# Patient Record
Sex: FEMALE | Race: WHITE | NOT HISPANIC OR LATINO
[De-identification: names, ages, dates, MRNs, and addresses within clinical notes are randomized per-mention and may not be internally consistent; named-entity substitution may affect disease eponyms.]

---

## 2018-07-01 ENCOUNTER — TRANSCRIPTION ENCOUNTER (OUTPATIENT)
Age: 83
End: 2018-07-01

## 2020-07-06 ENCOUNTER — OUTPATIENT (OUTPATIENT)
Dept: OUTPATIENT SERVICES | Facility: HOSPITAL | Age: 85
LOS: 1 days | Discharge: HOME | End: 2020-07-06

## 2020-07-06 DIAGNOSIS — D72.89 OTHER SPECIFIED DISORDERS OF WHITE BLOOD CELLS: ICD-10-CM

## 2020-07-08 ENCOUNTER — APPOINTMENT (OUTPATIENT)
Dept: HEMATOLOGY ONCOLOGY | Facility: CLINIC | Age: 85
End: 2020-07-08
Payer: MEDICARE

## 2020-07-08 DIAGNOSIS — Z86.79 PERSONAL HISTORY OF OTHER DISEASES OF THE CIRCULATORY SYSTEM: ICD-10-CM

## 2020-07-08 DIAGNOSIS — Z63.4 DISAPPEARANCE AND DEATH OF FAMILY MEMBER: ICD-10-CM

## 2020-07-08 DIAGNOSIS — E11.9 TYPE 2 DIABETES MELLITUS W/OUT COMPLICATIONS: ICD-10-CM

## 2020-07-08 DIAGNOSIS — Z86.73 PERSONAL HISTORY OF TRANSIENT ISCHEMIC ATTACK (TIA), AND CEREBRAL INFARCTION W/OUT RESIDUAL DEFICITS: ICD-10-CM

## 2020-07-08 DIAGNOSIS — H54.61 UNQUALIFIED VISUAL LOSS, RIGHT EYE, NORMAL VISION LEFT EYE: ICD-10-CM

## 2020-07-08 DIAGNOSIS — Z80.8 FAMILY HISTORY OF MALIGNANT NEOPLASM OF OTHER ORGANS OR SYSTEMS: ICD-10-CM

## 2020-07-08 DIAGNOSIS — Z86.39 PERSONAL HISTORY OF OTHER ENDOCRINE, NUTRITIONAL AND METABOLIC DISEASE: ICD-10-CM

## 2020-07-08 PROCEDURE — 99204 OFFICE O/P NEW MOD 45 MIN: CPT | Mod: 95

## 2020-07-08 RX ORDER — GLIPIZIDE 5 MG/1
5 TABLET ORAL
Refills: 0 | Status: ACTIVE | COMMUNITY

## 2020-07-08 RX ORDER — CLOPIDOGREL BISULFATE 75 MG/1
75 TABLET, FILM COATED ORAL
Refills: 0 | Status: ACTIVE | COMMUNITY

## 2020-07-08 RX ORDER — LOSARTAN POTASSIUM 100 MG/1
100 TABLET, FILM COATED ORAL
Refills: 0 | Status: ACTIVE | COMMUNITY

## 2020-07-08 RX ORDER — ASPIRIN 81 MG/1
81 TABLET ORAL
Refills: 0 | Status: ACTIVE | COMMUNITY

## 2020-07-08 RX ORDER — METFORMIN HYDROCHLORIDE 500 MG/1
500 TABLET, COATED ORAL
Refills: 0 | Status: ACTIVE | COMMUNITY

## 2020-07-08 RX ORDER — ATORVASTATIN CALCIUM 10 MG/1
10 TABLET, FILM COATED ORAL
Refills: 0 | Status: ACTIVE | COMMUNITY

## 2020-07-08 RX ORDER — UBIDECARENONE/VIT E ACET 100MG-5
CAPSULE ORAL
Refills: 0 | Status: ACTIVE | COMMUNITY

## 2020-07-08 SDOH — SOCIAL STABILITY - SOCIAL INSECURITY: DISSAPEARANCE AND DEATH OF FAMILY MEMBER: Z63.4

## 2020-07-08 NOTE — ASSESSMENT
[FreeTextEntry1] : #1)  Anemia–appears chronic upon review of prior lab work available.  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  No overt bleeding reported currently.  Guaiac stool.\par Differential diagnosis reviewed with patient and her daughter.  Check vitamin levels, SPEP.  May consider trial of growth factor pending course.\par #2) leukocytosis–suspect may be reactive in nature (note WBC within normal limits 11/2019), though no obvious infectious process to implicate clinically presently. Rule out underlying myeloproliferative disorder contributing now.  Patient is agreeable to follow-up lab work.  She and daughter wish for supportive/conservative hematologic care (and no bone marrow evaluation planned at this time).\par Patient and her daughter were given the opportunity to ask questions.  Their questions have been answered to their apparent satisfaction.  Patient is agreeable to recommended follow-up.

## 2020-07-08 NOTE — RESULTS/DATA
[FreeTextEntry1] : 6/23/2020–hemoglobin 10.1, hematocrit 30.9, MCV 93, WBC 11.6 with 68% neutrophils, 23% lymphocytes, platelet count 284,000.\par 11/18/2019–hemoglobin 10.5, hematocrit 30.7, MCV 89, WBC 9.8 with 66% neutrophils, 25% lymphocytes, platelet count 265,000.

## 2020-07-08 NOTE — CONSULT LETTER
[Dear  ___] : Dear  [unfilled], [Consult Letter:] : I had the pleasure of evaluating your patient, [unfilled]. [Consult Closing:] : Thank you very much for allowing me to participate in the care of this patient.  If you have any questions, please do not hesitate to contact me. [Please see my note below.] : Please see my note below. [Sincerely,] : Sincerely, [FreeTextEntry3] : Ana Currie MD

## 2020-07-08 NOTE — REVIEW OF SYSTEMS
[Patient Intake Form Reviewed] : Patient intake form was reviewed [Loss of Hearing] : loss of hearing [SOB on Exertion] : shortness of breath during exertion [Negative] : Heme/Lymph [FreeTextEntry3] : vision loss OD

## 2020-07-08 NOTE — PHYSICAL EXAM
[Normal] : affect appropriate [de-identified] : non-toxic appearing [de-identified] : anicteric, no conjunctival infection [de-identified] : breathing appeared unlabored [de-identified] : alert and oriented  [de-identified] : coloration appeared normal [de-identified] : pleasant

## 2020-07-08 NOTE — REASON FOR VISIT
[Home] : at home, [unfilled] , at the time of the visit. [Medical Office: ([facility])___] : at the medical office located in  [Verbal consent obtained from patient] : the patient, [unfilled] [Initial Consultation] : an initial consultation for [Other:____] : [unfilled] [FreeTextEntry2] : leukocytosis/anemia

## 2020-07-08 NOTE — HISTORY OF PRESENT ILLNESS
[de-identified] : 7/2020-Patient presenting at the request of her PCP for leukocytosis and anemia found on routine lab work.\par Patient is generally feeling well.  She has recently noticed some mild dyspnea on exertion, though remains quite active.  No complaints of chest pain, shortness of breath at rest, palpitations or lightheadedness.  No nausea/vomiting/abdominal or pelvic pain.  No blood per rectum, melena, hematuria or vaginal bleeding.  She began p.o. iron supplement (1 daily) approximately 2 weeks ago, and stated her energy has improved since she began the iron (along with vitamin C).\par No complaints of cough, fevers.\par She has never had a colonoscopy.\par

## 2020-07-23 LAB
SARS-COV-2 IGG SERPL IA-ACNC: <0.1 INDEX
SARS-COV-2 IGG SERPL QL IA: NEGATIVE

## 2020-07-28 LAB — T(9;22)(ABL1,BCR)/CONTROL BLD/T: NORMAL

## 2020-07-30 ENCOUNTER — APPOINTMENT (OUTPATIENT)
Dept: HEMATOLOGY ONCOLOGY | Facility: CLINIC | Age: 85
End: 2020-07-30

## 2020-07-31 LAB
JAK2 P.V617F BLD/T QL: NEGATIVE
JAK2RLX: NEGATIVE

## 2020-08-04 ENCOUNTER — OUTPATIENT (OUTPATIENT)
Dept: OUTPATIENT SERVICES | Facility: HOSPITAL | Age: 85
LOS: 1 days | Discharge: HOME | End: 2020-08-04

## 2020-08-04 DIAGNOSIS — D72.89 OTHER SPECIFIED DISORDERS OF WHITE BLOOD CELLS: ICD-10-CM

## 2020-08-06 ENCOUNTER — LABORATORY RESULT (OUTPATIENT)
Age: 85
End: 2020-08-06

## 2020-08-06 LAB
ALBUMIN MFR SERPL ELPH: 58.6 %
ALBUMIN SERPL-MCNC: 4 G/DL
ALBUMIN/GLOB SERPL: 1.4 RATIO
ALPHA1 GLOB MFR SERPL ELPH: 4.5 %
ALPHA1 GLOB SERPL ELPH-MCNC: 0.3 G/DL
ALPHA2 GLOB MFR SERPL ELPH: 12.9 %
ALPHA2 GLOB SERPL ELPH-MCNC: 0.9 G/DL
APPEARANCE: CLEAR
B-GLOBULIN MFR SERPL ELPH: 11.9 %
B-GLOBULIN SERPL ELPH-MCNC: 0.8 G/DL
BASOPHILS # BLD AUTO: 0.04 K/UL
BASOPHILS NFR BLD AUTO: 0.4 %
BILIRUBIN URINE: NEGATIVE
BLOOD URINE: NEGATIVE
COLOR: NORMAL
CREAT SERPL-MCNC: 1.05 MG/DL
EOSINOPHIL # BLD AUTO: 0.24 K/UL
EOSINOPHIL NFR BLD AUTO: 2.3 %
FERRITIN SERPL-MCNC: 81 NG/ML
FOLATE SERPL-MCNC: >20 NG/ML
GAMMA GLOB FLD ELPH-MCNC: 0.8 G/DL
GAMMA GLOB MFR SERPL ELPH: 12.1 %
GLUCOSE QUALITATIVE U: NEGATIVE
HAPTOGLOB SERPL-MCNC: 225 MG/DL
HCT VFR BLD CALC: 31.2 %
HGB BLD-MCNC: 9.9 G/DL
IMM GRANULOCYTES NFR BLD AUTO: 0.3 %
INTERPRETATION SERPL IEP-IMP: NORMAL
IRON SATN MFR SERPL: 23 %
IRON SERPL-MCNC: 64 UG/DL
KETONES URINE: NEGATIVE
LEUKOCYTE ESTERASE URINE: ABNORMAL
LYMPHOCYTES # BLD AUTO: 2.04 K/UL
LYMPHOCYTES NFR BLD AUTO: 19.5 %
MAN DIFF?: NORMAL
MCHC RBC-ENTMCNC: 30.5 PG
MCHC RBC-ENTMCNC: 31.7 GM/DL
MCV RBC AUTO: 96 FL
MONOCYTES # BLD AUTO: 0.63 K/UL
MONOCYTES NFR BLD AUTO: 6 %
NEUTROPHILS # BLD AUTO: 7.47 K/UL
NEUTROPHILS NFR BLD AUTO: 71.5 %
NITRITE URINE: NEGATIVE
PH URINE: 6
PLATELET # BLD AUTO: 264 K/UL
PROT SERPL-MCNC: 6.8 G/DL
PROT SERPL-MCNC: 6.8 G/DL
PROTEIN URINE: ABNORMAL
RBC # BLD: 3.25 M/UL
RBC # BLD: 3.25 M/UL
RBC # FLD: 13.6 %
RETICS # AUTO: 1.2 %
RETICS AGGREG/RBC NFR: 38.7 K/UL
RHEUMATOID FACT SER QL: <10 IU/ML
SPECIFIC GRAVITY URINE: 1.01
TIBC SERPL-MCNC: 280 UG/DL
UIBC SERPL-MCNC: 216 UG/DL
UROBILINOGEN URINE: NORMAL
VIT B12 SERPL-MCNC: 345 PG/ML
WBC # FLD AUTO: 10.45 K/UL

## 2020-08-08 LAB — ANA SER IF-ACNC: NEGATIVE

## 2020-08-10 ENCOUNTER — APPOINTMENT (OUTPATIENT)
Dept: HEMATOLOGY ONCOLOGY | Facility: CLINIC | Age: 85
End: 2020-08-10
Payer: MEDICARE

## 2020-08-10 PROCEDURE — 99213 OFFICE O/P EST LOW 20 MIN: CPT | Mod: 95

## 2020-08-10 NOTE — ASSESSMENT
[FreeTextEntry1] : #1)  Anemia–chronic.  History of decreased EGFR may contribute.  No overt bleeding reported currently. Patient without related symptoms at this time.  Patient to begin p.o. vitamin B12 supplement for a vitamin B12 level of less than 400.\par #2) h/o leukocytosis–suspect reactive in nature.\par Reviewed lab work with patient and her daughter.  Discussed consideration of trial of growth factor if further decrease in hemoglobin-may further consider pending short interval follow-up lab work. \par Patient to follow-up with PCP regarding proteinuria/primary care issues.\par Patient and her daughter were given the opportunity to ask questions.  Their questions have been answered to their apparent satisfaction.  Patient is agreeable to recommended follow-up.  Supportive hematologic care planned.

## 2020-08-10 NOTE — PHYSICAL EXAM
[Normal] : PERRL, EOMI, no conjunctival infection, anicteric [de-identified] : breathing appeared unlabored [de-identified] : non-toxic appearing [de-identified] : alert and oriented  [de-identified] : coloration appeared normal [de-identified] : pleasant

## 2020-08-10 NOTE — HISTORY OF PRESENT ILLNESS
[de-identified] : 7/2020-Patient presented at the request of her PCP for leukocytosis and anemia found on routine lab work.\par \par \par \par  [de-identified] : Telehealth visit due to COVID-19 pandemic.\par Feeling well with good energy level. No complaints of chest pain, shortness of breath at rest, palpitations or lightheadedness.  No nausea/vomiting/abdominal or pelvic pain.  No blood per rectum, melena, hematuria or vaginal bleeding.  No c/o dysuria or fevers. She is taking p.o. iron supplement (1 daily) and is tolerating this well.

## 2020-08-10 NOTE — REASON FOR VISIT
[Home] : at home, [unfilled] , at the time of the visit. [Follow-Up Visit] : a follow-up visit for [Verbal consent obtained from patient] : the patient, [unfilled] [Medical Office: ([facility])___] : at the medical office located in  [Other:____] : [unfilled] [FreeTextEntry2] : anemia/leukocytosis

## 2020-08-21 ENCOUNTER — TRANSCRIPTION ENCOUNTER (OUTPATIENT)
Age: 85
End: 2020-08-21

## 2020-08-21 ENCOUNTER — EMERGENCY (EMERGENCY)
Facility: HOSPITAL | Age: 85
LOS: 1 days | Discharge: HOME | End: 2020-08-21
Attending: EMERGENCY MEDICINE
Payer: MEDICARE

## 2020-08-21 VITALS
DIASTOLIC BLOOD PRESSURE: 84 MMHG | TEMPERATURE: 98 F | SYSTOLIC BLOOD PRESSURE: 149 MMHG | HEART RATE: 94 BPM | HEIGHT: 56 IN | WEIGHT: 130.07 LBS | OXYGEN SATURATION: 99 % | RESPIRATION RATE: 16 BRPM

## 2020-08-21 VITALS
DIASTOLIC BLOOD PRESSURE: 72 MMHG | RESPIRATION RATE: 16 BRPM | OXYGEN SATURATION: 100 % | SYSTOLIC BLOOD PRESSURE: 206 MMHG | HEART RATE: 90 BPM

## 2020-08-21 LAB
ALBUMIN SERPL ELPH-MCNC: 4.4 G/DL — SIGNIFICANT CHANGE UP (ref 3.3–5)
ALP SERPL-CCNC: 78 U/L — SIGNIFICANT CHANGE UP (ref 40–120)
ALT FLD-CCNC: 10 U/L — SIGNIFICANT CHANGE UP (ref 10–45)
ANION GAP SERPL CALC-SCNC: 15 MMOL/L — SIGNIFICANT CHANGE UP (ref 5–17)
APTT BLD: 38.8 SEC — HIGH (ref 27.5–35.5)
AST SERPL-CCNC: 15 U/L — SIGNIFICANT CHANGE UP (ref 10–40)
BASOPHILS # BLD AUTO: 0.05 K/UL — SIGNIFICANT CHANGE UP (ref 0–0.2)
BASOPHILS NFR BLD AUTO: 0.3 % — SIGNIFICANT CHANGE UP (ref 0–2)
BILIRUB SERPL-MCNC: 0.2 MG/DL — SIGNIFICANT CHANGE UP (ref 0.2–1.2)
BUN SERPL-MCNC: 18 MG/DL — SIGNIFICANT CHANGE UP (ref 7–23)
CALCIUM SERPL-MCNC: 9.8 MG/DL — SIGNIFICANT CHANGE UP (ref 8.4–10.5)
CHLORIDE SERPL-SCNC: 99 MMOL/L — SIGNIFICANT CHANGE UP (ref 96–108)
CO2 SERPL-SCNC: 21 MMOL/L — LOW (ref 22–31)
CREAT SERPL-MCNC: 1.06 MG/DL — SIGNIFICANT CHANGE UP (ref 0.5–1.3)
EOSINOPHIL # BLD AUTO: 0.05 K/UL — SIGNIFICANT CHANGE UP (ref 0–0.5)
EOSINOPHIL NFR BLD AUTO: 0.3 % — SIGNIFICANT CHANGE UP (ref 0–6)
GLUCOSE SERPL-MCNC: 133 MG/DL — HIGH (ref 70–99)
HCT VFR BLD CALC: 32.3 % — LOW (ref 34.5–45)
HGB BLD-MCNC: 10.5 G/DL — LOW (ref 11.5–15.5)
IMM GRANULOCYTES NFR BLD AUTO: 0.5 % — SIGNIFICANT CHANGE UP (ref 0–1.5)
INR BLD: 0.98 RATIO — SIGNIFICANT CHANGE UP (ref 0.88–1.16)
LYMPHOCYTES # BLD AUTO: 1.32 K/UL — SIGNIFICANT CHANGE UP (ref 1–3.3)
LYMPHOCYTES # BLD AUTO: 8.6 % — LOW (ref 13–44)
MCHC RBC-ENTMCNC: 30.2 PG — SIGNIFICANT CHANGE UP (ref 27–34)
MCHC RBC-ENTMCNC: 32.5 GM/DL — SIGNIFICANT CHANGE UP (ref 32–36)
MCV RBC AUTO: 92.8 FL — SIGNIFICANT CHANGE UP (ref 80–100)
MONOCYTES # BLD AUTO: 0.64 K/UL — SIGNIFICANT CHANGE UP (ref 0–0.9)
MONOCYTES NFR BLD AUTO: 4.2 % — SIGNIFICANT CHANGE UP (ref 2–14)
NEUTROPHILS # BLD AUTO: 13.29 K/UL — HIGH (ref 1.8–7.4)
NEUTROPHILS NFR BLD AUTO: 86.1 % — HIGH (ref 43–77)
NRBC # BLD: 0 /100 WBCS — SIGNIFICANT CHANGE UP (ref 0–0)
PLATELET # BLD AUTO: 297 K/UL — SIGNIFICANT CHANGE UP (ref 150–400)
POTASSIUM SERPL-MCNC: 4.6 MMOL/L — SIGNIFICANT CHANGE UP (ref 3.5–5.3)
POTASSIUM SERPL-SCNC: 4.6 MMOL/L — SIGNIFICANT CHANGE UP (ref 3.5–5.3)
PROT SERPL-MCNC: 7.2 G/DL — SIGNIFICANT CHANGE UP (ref 6–8.3)
PROTHROM AB SERPL-ACNC: 11.7 SEC — SIGNIFICANT CHANGE UP (ref 10.6–13.6)
RBC # BLD: 3.48 M/UL — LOW (ref 3.8–5.2)
RBC # FLD: 13.2 % — SIGNIFICANT CHANGE UP (ref 10.3–14.5)
SODIUM SERPL-SCNC: 135 MMOL/L — SIGNIFICANT CHANGE UP (ref 135–145)
WBC # BLD: 15.42 K/UL — HIGH (ref 3.8–10.5)
WBC # FLD AUTO: 15.42 K/UL — HIGH (ref 3.8–10.5)

## 2020-08-21 PROCEDURE — 72125 CT NECK SPINE W/O DYE: CPT | Mod: 26

## 2020-08-21 PROCEDURE — 73030 X-RAY EXAM OF SHOULDER: CPT

## 2020-08-21 PROCEDURE — 76377 3D RENDER W/INTRP POSTPROCES: CPT | Mod: 26

## 2020-08-21 PROCEDURE — 73060 X-RAY EXAM OF HUMERUS: CPT

## 2020-08-21 PROCEDURE — 70486 CT MAXILLOFACIAL W/O DYE: CPT | Mod: 26

## 2020-08-21 PROCEDURE — 85730 THROMBOPLASTIN TIME PARTIAL: CPT

## 2020-08-21 PROCEDURE — 76377 3D RENDER W/INTRP POSTPROCES: CPT

## 2020-08-21 PROCEDURE — 99285 EMERGENCY DEPT VISIT HI MDM: CPT

## 2020-08-21 PROCEDURE — 85027 COMPLETE CBC AUTOMATED: CPT

## 2020-08-21 PROCEDURE — 70486 CT MAXILLOFACIAL W/O DYE: CPT

## 2020-08-21 PROCEDURE — 80053 COMPREHEN METABOLIC PANEL: CPT

## 2020-08-21 PROCEDURE — 70450 CT HEAD/BRAIN W/O DYE: CPT | Mod: 26

## 2020-08-21 PROCEDURE — 99284 EMERGENCY DEPT VISIT MOD MDM: CPT | Mod: 25

## 2020-08-21 PROCEDURE — 72125 CT NECK SPINE W/O DYE: CPT

## 2020-08-21 PROCEDURE — 70450 CT HEAD/BRAIN W/O DYE: CPT

## 2020-08-21 PROCEDURE — 85610 PROTHROMBIN TIME: CPT

## 2020-08-21 PROCEDURE — 73060 X-RAY EXAM OF HUMERUS: CPT | Mod: 26,LT

## 2020-08-21 PROCEDURE — 73030 X-RAY EXAM OF SHOULDER: CPT | Mod: 26,LT

## 2020-08-21 NOTE — ED PROVIDER NOTE - PATIENT PORTAL LINK FT
You can access the FollowMyHealth Patient Portal offered by [facility] by registering at the following website: [email or web address]. By joining [facility]’s FollowMyHealth portal, you will also be able to view your health information using other applications (apps) compatible with our system.

## 2020-08-21 NOTE — ED ADULT NURSE NOTE - OBJECTIVE STATEMENT
87 y f came to the ed after a mechanical fall. patient states she was walking trying to avoid stepping in geese feces when she lost her balance and fell. patient denies loc but said she did hit her head. patient has hematoma over left eye. patient states taking plavix for anticoagulation. patient denies any new vision changes. patient is a/ox3. denies any fevers, chills, chest pain, sob. equal strength and sensation in extremities. skin is warm and dry.

## 2020-08-21 NOTE — ED PROVIDER NOTE - PHYSICAL EXAMINATION
GENERAL: no acute distress, non-toxic appearing  HEAD: normocephalic, atraumatic  HEENT: normal conjunctiva, oral mucosa moist, neck supple  CARDIAC: regular rate and rhythm, normal S1 and S2,  no appreciable murmurs  PULM: clear to ascultation bilaterally, no crackles, rales, rhonchi, or wheezing  GI: abdomen nondistended, soft, nontender, no guarding or rebound tenderness  : no CVA tenderness, no suprapubic tenderness  NEURO: alert and oriented x 3, normal speech, PERRLA, EOMI, no focal motor or sensory deficits, nonantalgic gait    MSK: no visible deformities, pain to palpation over the L shoulder with difficultly raising L arm over head. no hip pain, pelvis is stable    SKIN: no visible rashes, dry, well-perfused  PSYCH: appropriate mood and affect

## 2020-08-21 NOTE — ED PROVIDER NOTE - OBJECTIVE STATEMENT
86 yo F with a pmhx of diabetes, prior stroke, HTN presents to the ED s/p mechanical fall. She was trying to step over a curb before she fell. She denies any LOC. She denies any prodromal symptoms. She fell on her head over her left face. She is complaining of left shoulder pain. Shd denies any CP, SOB, abdominal pain, urinary symptoms, back pain, neck pain, vision changes, fevers, chills, N/V/D.

## 2020-08-21 NOTE — ED PROVIDER NOTE - CLINICAL SUMMARY MEDICAL DECISION MAKING FREE TEXT BOX
88 yo F with a pmhx of diabetes, prior stroke, HTN presents to the ED s/p mechanical fall. no prodromal symptoms. complaining of L shoulder pain and unable to raise L arm overhead. ecchymosis inferior to the left eye. no burton sign, no periauricular ecchymosis, no hemotympanum.     will order head ct, cervical, maxfac, will obtain xray of LUE. reassess 86 yo F with a pmhx of diabetes, prior stroke, HTN presents to the ED s/p mechanical fall. no prodromal symptoms. complaining of L shoulder pain and unable to raise L arm overhead. ecchymosis inferior to the left eye. no burton sign, no periauricular ecchymosis, no hemotympanum.     will order head ct, cervical, maxfac, will obtain xray of LUE. reassess  ZR

## 2020-08-21 NOTE — ED PROVIDER NOTE - NSFOLLOWUPINSTRUCTIONS_ED_ALL_ED_FT
-You were seen in the Emergency Department (ED) for FALL. Lab and imaging results, if performed, were discussed with you along with your discharge diagnosis.    PLEASE FOLLOW UP WITH YOUR PRIMARY CARE DOCTOR. PLEASE BRING ALL DOCUMENTATION WITH YOU TO YOUR VISIT.    MEDICATIONS:  -Continue all other prescribed medicine, IF ANY, as per your primary care doctor's (PMD) recommendations.    PAIN CONTROL:  -Please take over the counter Tylenol (also known as acetaminophen) 650mg every 6 hours or Ibuprofen (also known as motrin, advil) 600mg every 8 hour for your pain, IF ANY, unless you are not supposed to for any reason.  -Rest, stay hydrated with plenty of fluids (drink at least 2 Liters or 64 Ounces of water each day UNLESS you are supposed to restrict fluids or ANY reason.    FOLLOW-UP:  -Please follow up with your private physician within the next 72 hours. Tell them you were recently in the ED for an urgent issue and would like to be seen. Bring copies of your results if you were given.   -If you do not have a PMD, please call [phone] to find one convenient for you or call our clinic at [phone].    RETURN PRECAUTIONS:  -Please return to the Emergency Department if you experience ANY new or concerning symptoms, such as, but not limited to: worsening pain, large amount of bleeding, passing out, fever >100.F, shaking chills, inability to see or new double vision, chest pain, difficulty breathing, diffuse abdominal pain, unable to eat or drink, continuous vomiting or diarrhea, unable to move or feel part of your body

## 2020-08-21 NOTE — ED PROVIDER NOTE - NS ED ROS FT
GENERAL: no fever, chills    HEENT: no cough, congestion, odynophagia, dysphagia    CARDIAC: no chest pain, palpitations, lightheadedness  PULM: no dyspnea, wheezing   GI: no abdominal pain, nausea, vomiting, diarrhea, constipation, melena, hematochezia  : no urinary dysuria, frequency, incontinence, hematuria  NEURO: no headache, motor weakness, sensory changes  MSK: (+) L shoulder pain  SKIN: no rashes, hives  HEME: no active bleeding, bruising

## 2020-11-12 ENCOUNTER — OUTPATIENT (OUTPATIENT)
Dept: OUTPATIENT SERVICES | Facility: HOSPITAL | Age: 85
LOS: 1 days | Discharge: HOME | End: 2020-11-12

## 2020-11-12 DIAGNOSIS — D72.89 OTHER SPECIFIED DISORDERS OF WHITE BLOOD CELLS: ICD-10-CM

## 2020-12-11 LAB
BASOPHILS # BLD AUTO: 0.04 K/UL
BASOPHILS NFR BLD AUTO: 0.5 %
EOSINOPHIL # BLD AUTO: 0.22 K/UL
EOSINOPHIL NFR BLD AUTO: 2.8 %
FOLATE SERPL-MCNC: 19.1 NG/ML
HCT VFR BLD CALC: 33.5 %
HGB BLD-MCNC: 10.5 G/DL
IMM GRANULOCYTES NFR BLD AUTO: 0.1 %
LYMPHOCYTES # BLD AUTO: 2.33 K/UL
LYMPHOCYTES NFR BLD AUTO: 29.9 %
MAN DIFF?: NORMAL
MCHC RBC-ENTMCNC: 30.9 PG
MCHC RBC-ENTMCNC: 31.3 GM/DL
MCV RBC AUTO: 98.5 FL
MONOCYTES # BLD AUTO: 0.58 K/UL
MONOCYTES NFR BLD AUTO: 7.5 %
NEUTROPHILS # BLD AUTO: 4.6 K/UL
NEUTROPHILS NFR BLD AUTO: 59.2 %
PLATELET # BLD AUTO: 245 K/UL
RBC # BLD: 3.4 M/UL
RBC # FLD: 13 %
VIT B12 SERPL-MCNC: 1005 PG/ML
WBC # FLD AUTO: 7.78 K/UL

## 2020-12-17 ENCOUNTER — OUTPATIENT (OUTPATIENT)
Dept: OUTPATIENT SERVICES | Facility: HOSPITAL | Age: 85
LOS: 1 days | Discharge: HOME | End: 2020-12-17

## 2020-12-17 DIAGNOSIS — D72.89 OTHER SPECIFIED DISORDERS OF WHITE BLOOD CELLS: ICD-10-CM

## 2020-12-23 ENCOUNTER — APPOINTMENT (OUTPATIENT)
Dept: HEMATOLOGY ONCOLOGY | Facility: CLINIC | Age: 85
End: 2020-12-23
Payer: MEDICARE

## 2020-12-23 PROCEDURE — 99213 OFFICE O/P EST LOW 20 MIN: CPT | Mod: 95

## 2020-12-23 NOTE — ASSESSMENT
[FreeTextEntry1] : #1)  Anemia–chronic.  History of decreased EGFR may contribute.  No overt bleeding reported currently. Patient without related symptoms at this time. Hemoglobin stable at present. Patient to take p.o. vitamin B12 supplement for a vitamin B12 level of less than 400, and she will continue PO iron supplement for now. Holding on GF at this time. Interval f/u lab work to be done. \par #2) h/o leukocytosis–suspect reactive in nature. Most recent WBC WNL. Follow clinically.\par Patient and her daughter were given the opportunity to ask questions.  Their questions have been answered to their apparent satisfaction.  Patient is agreeable to recommended follow-up.  Supportive hematologic care planned.

## 2020-12-23 NOTE — HISTORY OF PRESENT ILLNESS
[de-identified] : 7/2020-Patient presented at the request of her PCP for leukocytosis and anemia found on routine lab work.\par \par \par \par  [de-identified] : Telehealth visit due to COVID-19 pandemic.\par Taking 1 iron tab daily.\par No complaints of chest pain, shortness of breath at rest, palpitations or lightheadedness.  No nausea/vomiting/abdominal or pelvic pain.  No blood per rectum, melena, hematuria or vaginal bleeding.  No c/o dysuria or fevers. \par Daughter assisted with visit.

## 2020-12-23 NOTE — REASON FOR VISIT
[Home] : at home, [unfilled] , at the time of the visit. [Medical Office: ([facility])___] : at the medical office located in  [Family Member] : family member [Verbal consent obtained from patient] : the patient, [unfilled] [Follow-Up Visit] : a follow-up visit for [FreeTextEntry2] : anemia/leukocytosis

## 2020-12-23 NOTE — PHYSICAL EXAM
[Normal] : affect appropriate [de-identified] : non-toxic appearing [de-identified] : breathing appeared unlabored [de-identified] : coloration appeared normal [de-identified] : alert and oriented  [de-identified] : pleasant

## 2021-01-25 ENCOUNTER — TRANSCRIPTION ENCOUNTER (OUTPATIENT)
Age: 86
End: 2021-01-25

## 2021-03-09 LAB
BASOPHILS # BLD AUTO: 0.04 K/UL
BASOPHILS NFR BLD AUTO: 0.5 %
EOSINOPHIL # BLD AUTO: 0.21 K/UL
EOSINOPHIL NFR BLD AUTO: 2.4 %
FERRITIN SERPL-MCNC: 222 NG/ML
FOLATE SERPL-MCNC: 12.4 NG/ML
HCT VFR BLD CALC: 27.5 %
HGB BLD-MCNC: 8.7 G/DL
IMM GRANULOCYTES NFR BLD AUTO: 0.3 %
IRON SATN MFR SERPL: 27 %
IRON SERPL-MCNC: 70 UG/DL
LYMPHOCYTES # BLD AUTO: 1.53 K/UL
LYMPHOCYTES NFR BLD AUTO: 17.7 %
MAN DIFF?: NORMAL
MCHC RBC-ENTMCNC: 31.6 GM/DL
MCHC RBC-ENTMCNC: 32.5 PG
MCV RBC AUTO: 102.6 FL
MONOCYTES # BLD AUTO: 0.63 K/UL
MONOCYTES NFR BLD AUTO: 7.3 %
NEUTROPHILS # BLD AUTO: 6.21 K/UL
NEUTROPHILS NFR BLD AUTO: 71.8 %
PLATELET # BLD AUTO: 274 K/UL
RBC # BLD: 2.68 M/UL
RBC # BLD: 2.68 M/UL
RBC # FLD: 13.2 %
RETICS # AUTO: 1 %
RETICS AGGREG/RBC NFR: 26.5 K/UL
SARS-COV-2 IGG SERPL IA-ACNC: 0.11 INDEX
SARS-COV-2 IGG SERPL QL IA: NEGATIVE
TIBC SERPL-MCNC: 264 UG/DL
UIBC SERPL-MCNC: 193 UG/DL
VIT B12 SERPL-MCNC: 1092 PG/ML
WBC # FLD AUTO: 8.65 K/UL

## 2021-03-15 ENCOUNTER — NON-APPOINTMENT (OUTPATIENT)
Age: 86
End: 2021-03-15

## 2021-03-17 ENCOUNTER — OUTPATIENT (OUTPATIENT)
Dept: OUTPATIENT SERVICES | Facility: HOSPITAL | Age: 86
LOS: 1 days | Discharge: HOME | End: 2021-03-17

## 2021-03-17 DIAGNOSIS — D72.89 OTHER SPECIFIED DISORDERS OF WHITE BLOOD CELLS: ICD-10-CM

## 2021-03-22 ENCOUNTER — APPOINTMENT (OUTPATIENT)
Dept: HEMATOLOGY ONCOLOGY | Facility: CLINIC | Age: 86
End: 2021-03-22

## 2021-03-23 ENCOUNTER — APPOINTMENT (OUTPATIENT)
Dept: HEMATOLOGY ONCOLOGY | Facility: CLINIC | Age: 86
End: 2021-03-23
Payer: MEDICARE

## 2021-03-23 VITALS — SYSTOLIC BLOOD PRESSURE: 176 MMHG | DIASTOLIC BLOOD PRESSURE: 60 MMHG

## 2021-03-23 VITALS
RESPIRATION RATE: 17 BRPM | TEMPERATURE: 96.9 F | HEART RATE: 86 BPM | HEIGHT: 57.8 IN | SYSTOLIC BLOOD PRESSURE: 199 MMHG | WEIGHT: 130.95 LBS | BODY MASS INDEX: 27.49 KG/M2 | DIASTOLIC BLOOD PRESSURE: 64 MMHG | OXYGEN SATURATION: 99 %

## 2021-03-23 PROCEDURE — 99214 OFFICE O/P EST MOD 30 MIN: CPT

## 2021-03-23 PROCEDURE — 99072 ADDL SUPL MATRL&STAF TM PHE: CPT

## 2021-03-23 RX ORDER — GABAPENTIN 100 MG/1
100 CAPSULE ORAL
Qty: 180 | Refills: 0 | Status: ACTIVE | COMMUNITY
Start: 2020-05-13

## 2021-03-23 RX ORDER — APIXABAN 2.5 MG/1
2.5 TABLET, FILM COATED ORAL
Qty: 60 | Refills: 0 | Status: ACTIVE | COMMUNITY
Start: 2021-02-02

## 2021-03-23 RX ORDER — OMEPRAZOLE 20 MG/1
20 CAPSULE, DELAYED RELEASE ORAL
Qty: 30 | Refills: 0 | Status: ACTIVE | COMMUNITY
Start: 2021-02-25

## 2021-03-23 RX ORDER — VALSARTAN 320 MG/1
320 TABLET, COATED ORAL
Qty: 90 | Refills: 0 | Status: ACTIVE | COMMUNITY
Start: 2021-01-11

## 2021-03-23 RX ORDER — DIAZEPAM 2 MG/1
2 TABLET ORAL
Qty: 15 | Refills: 0 | Status: ACTIVE | COMMUNITY
Start: 2021-02-19

## 2021-03-23 RX ORDER — ATORVASTATIN CALCIUM 40 MG/1
40 TABLET, FILM COATED ORAL
Qty: 30 | Refills: 0 | Status: ACTIVE | COMMUNITY
Start: 2021-02-03

## 2021-03-23 RX ORDER — AMLODIPINE BESYLATE 10 MG/1
10 TABLET ORAL
Qty: 30 | Refills: 0 | Status: ACTIVE | COMMUNITY
Start: 2021-01-28

## 2021-03-23 RX ORDER — HYDRALAZINE HYDROCHLORIDE 50 MG/1
50 TABLET ORAL
Qty: 90 | Refills: 0 | Status: ACTIVE | COMMUNITY
Start: 2021-01-28

## 2021-03-23 RX ORDER — AMLODIPINE BESYLATE 5 MG/1
5 TABLET ORAL
Qty: 90 | Refills: 0 | Status: ACTIVE | COMMUNITY
Start: 2021-02-22

## 2021-03-23 RX ORDER — GLIPIZIDE 5 MG/1
5 TABLET, FILM COATED, EXTENDED RELEASE ORAL
Qty: 240 | Refills: 0 | Status: ACTIVE | COMMUNITY
Start: 2020-07-20

## 2021-03-23 RX ORDER — HYDROCHLOROTHIAZIDE 25 MG/1
25 TABLET ORAL
Qty: 90 | Refills: 0 | Status: ACTIVE | COMMUNITY
Start: 2021-01-11

## 2021-03-23 NOTE — PHYSICAL EXAM
[Normal] : full range of motion and no deformities appreciated [de-identified] : non-toxic appearing [de-identified] : no calf tenderness [de-identified] : resolving ecchymosis left flank [de-identified] : alert and oriented  [de-identified] : pleasant

## 2021-03-23 NOTE — REASON FOR VISIT
[Follow-Up Visit] : a follow-up visit for [Family Member] : family member [FreeTextEntry2] : leukocytosis, anemia

## 2021-03-23 NOTE — CONSULT LETTER
[Dear  ___] : Dear  [unfilled], [Courtesy Letter:] : I had the pleasure of seeing your patient, [unfilled], in my office today. [Please see my note below.] : Please see my note below. [Consult Closing:] : Thank you very much for allowing me to participate in the care of this patient.  If you have any questions, please do not hesitate to contact me. [Sincerely,] : Sincerely, [FreeTextEntry3] : Ana Currie MD

## 2021-03-23 NOTE — ASSESSMENT
[FreeTextEntry1] : #1)  Anemia–chronic.  History of decreased EGFR may contribute.  Recently exacerbated by hematoma/retroperitoneal bleed.  No overt bleeding noted currently. Patient without related symptoms at this time. Hemoglobin stable at present (most recent lab work available reviewed, compared to hospital lab work). Patient to take p.o. vitamin B12 supplement for h/o a vitamin B12 level of less than 400, and she will continue PO iron supplement for now, though increase it to twice daily dosing–takes vitamin C supplement as well. Holding on GF at this time. Interval f/u lab work to be done. \par #2) h/o leukocytosis–suspect reactive in nature. Most recent WBC WNL. Follow clinically.\par \par Patient and her daughter were given the opportunity to ask questions.  Their questions have been answered to their apparent satisfaction.  Patient is agreeable to recommended follow-up.  Supportive hematologic care planned.

## 2021-03-23 NOTE — HISTORY OF PRESENT ILLNESS
[de-identified] : 7/2020-Patient presented at the request of her PCP for leukocytosis and anemia found on routine lab work.\par \par \par \par  [de-identified] : S/P hospitalization post a fall with resultant hematoma/retroperitoneal bleed.\par Once home, had TIA-develeoped left facial droop and dysarthria-those symptoms improved.\par PCP working on BP management. BS under control.\par Off Plavix and aspirin now.\par Taking 1 iron tab daily.\par No complaints of chest pain, shortness of breath at rest, palpitations or lightheadedness.  No nausea/vomiting/abdominal or pelvic pain.  No blood per rectum, melena, hematuria or vaginal bleeding.  No c/o dysuria or fevers. \par Daughter Elizabeth assisted with visit.

## 2021-04-12 ENCOUNTER — NON-APPOINTMENT (OUTPATIENT)
Age: 86
End: 2021-04-12

## 2021-04-20 LAB
BASOPHILS # BLD AUTO: 0.05 K/UL
BASOPHILS NFR BLD AUTO: 0.5 %
EOSINOPHIL # BLD AUTO: 0.15 K/UL
EOSINOPHIL NFR BLD AUTO: 1.4 %
FERRITIN SERPL-MCNC: 142 NG/ML
FOLATE SERPL-MCNC: >20 NG/ML
HCT VFR BLD CALC: 28.9 %
HGB BLD-MCNC: 9.5 G/DL
IMM GRANULOCYTES NFR BLD AUTO: 0.4 %
IRON SATN MFR SERPL: 20 %
IRON SERPL-MCNC: 55 UG/DL
LYMPHOCYTES # BLD AUTO: 2.21 K/UL
LYMPHOCYTES NFR BLD AUTO: 21.3 %
MAN DIFF?: NORMAL
MCHC RBC-ENTMCNC: 32.5 PG
MCHC RBC-ENTMCNC: 32.9 GM/DL
MCV RBC AUTO: 99 FL
MONOCYTES # BLD AUTO: 0.82 K/UL
MONOCYTES NFR BLD AUTO: 7.9 %
NEUTROPHILS # BLD AUTO: 7.12 K/UL
NEUTROPHILS NFR BLD AUTO: 68.5 %
PLATELET # BLD AUTO: 265 K/UL
RBC # BLD: 2.92 M/UL
RBC # BLD: 2.92 M/UL
RBC # FLD: 12.9 %
RETICS # AUTO: 1 %
RETICS AGGREG/RBC NFR: 30.1 K/UL
TIBC SERPL-MCNC: 278 UG/DL
UIBC SERPL-MCNC: 222 UG/DL
VIT B12 SERPL-MCNC: 1009 PG/ML
WBC # FLD AUTO: 10.39 K/UL

## 2021-04-24 ENCOUNTER — OUTPATIENT (OUTPATIENT)
Dept: OUTPATIENT SERVICES | Facility: HOSPITAL | Age: 86
LOS: 1 days | Discharge: HOME | End: 2021-04-24

## 2021-04-24 DIAGNOSIS — D72.89 OTHER SPECIFIED DISORDERS OF WHITE BLOOD CELLS: ICD-10-CM

## 2021-04-27 ENCOUNTER — APPOINTMENT (OUTPATIENT)
Dept: HEMATOLOGY ONCOLOGY | Facility: CLINIC | Age: 86
End: 2021-04-27
Payer: MEDICARE

## 2021-04-27 VITALS
TEMPERATURE: 96.8 F | WEIGHT: 129.98 LBS | RESPIRATION RATE: 17 BRPM | BODY MASS INDEX: 27.28 KG/M2 | DIASTOLIC BLOOD PRESSURE: 62 MMHG | SYSTOLIC BLOOD PRESSURE: 209 MMHG | HEIGHT: 58 IN | OXYGEN SATURATION: 98 % | HEART RATE: 75 BPM

## 2021-04-27 VITALS — DIASTOLIC BLOOD PRESSURE: 80 MMHG | SYSTOLIC BLOOD PRESSURE: 180 MMHG

## 2021-04-27 PROCEDURE — 99072 ADDL SUPL MATRL&STAF TM PHE: CPT

## 2021-04-27 PROCEDURE — 99213 OFFICE O/P EST LOW 20 MIN: CPT

## 2021-04-27 RX ORDER — METOPROLOL TARTRATE 25 MG/1
25 TABLET, FILM COATED ORAL DAILY
Refills: 0 | Status: ACTIVE | COMMUNITY
Start: 2021-04-27

## 2021-04-27 NOTE — PHYSICAL EXAM
[Normal] : affect appropriate [de-identified] : non-toxic appearing [de-identified] : no calf tenderness [de-identified] : alert and oriented  [de-identified] : pleasant

## 2021-04-27 NOTE — ASSESSMENT
[FreeTextEntry1] : Lab work reviewed.\par \par #1)  Anemia–chronic.  History of decreased EGFR may contribute.  Most recently was exacerbated by hematoma/retroperitoneal bleed.  No overt bleeding noted currently. Patient without related symptoms at this time. Hemoglobin improving. \par Patient to take p.o. vitamin B12 supplement for h/o a vitamin B12 level of less than 400.  Discussed iron treatment options with respective pros and cons. Patient declines continuing p.o. iron due to GI intolerance.  She wishes to try a multivitamin which contains iron p.o.  Interval follow-up lab work to be done.  Pending course, consider trial of parenteral iron if needed.\par \par #2) H/O leukocytosis–suspect reactive in nature. Most recent WBC WNL. Follow clinically.\par \par #3) Patient to take blood pressure medication as had been ordered by PCP, and will recheck her blood pressure once home.  Continue follow-up with PCP regarding this issue.\par \par Patient and her daughter were given the opportunity to ask questions.  Their questions have been answered to their apparent satisfaction.  Patient is agreeable to recommended follow-up.  Supportive hematologic care planned.

## 2021-04-27 NOTE — HISTORY OF PRESENT ILLNESS
[de-identified] : 7/2020-Patient presented at the request of her PCP for leukocytosis and anemia found on routine lab work. Intolerant to PO iron.\par \par \par \par  [de-identified] : Just took extra BP med now, along with some valium. Daughter stated patient's BP is elevated in physician's offices. \par Start taking iron supplement altogether, due to diarrhea.\par No complaints of H/A, chest pain, shortness of breath at rest, palpitations or lightheadedness.  No nausea/vomiting/abdominal or pelvic pain.  No blood per rectum, melena, hematuria or vaginal bleeding.  No c/o dysuria or fevers. \par Daughter Elizabeth assisted with visit history.

## 2021-05-20 LAB
BASOPHILS # BLD AUTO: 0.04 K/UL
BASOPHILS NFR BLD AUTO: 0.5 %
EOSINOPHIL # BLD AUTO: 0.13 K/UL
EOSINOPHIL NFR BLD AUTO: 1.6 %
FERRITIN SERPL-MCNC: 143 NG/ML
FOLATE SERPL-MCNC: >20 NG/ML
HCT VFR BLD CALC: 29.9 %
HGB BLD-MCNC: 9.5 G/DL
IMM GRANULOCYTES NFR BLD AUTO: 0.2 %
IRON SATN MFR SERPL: 24 %
IRON SERPL-MCNC: 69 UG/DL
IRON SERPL-MCNC: 70 UG/DL
LYMPHOCYTES # BLD AUTO: 1.72 K/UL
LYMPHOCYTES NFR BLD AUTO: 21.3 %
MAN DIFF?: NORMAL
MCHC RBC-ENTMCNC: 31.7 PG
MCHC RBC-ENTMCNC: 31.8 GM/DL
MCV RBC AUTO: 99.7 FL
MONOCYTES # BLD AUTO: 0.67 K/UL
MONOCYTES NFR BLD AUTO: 8.3 %
NEUTROPHILS # BLD AUTO: 5.5 K/UL
NEUTROPHILS NFR BLD AUTO: 68.1 %
PLATELET # BLD AUTO: 241 K/UL
RBC # BLD: 2.91 M/UL
RBC # BLD: 3 M/UL
RBC # FLD: 13 %
RETICS # AUTO: 0.8 %
RETICS AGGREG/RBC NFR: 22.4 K/UL
TIBC SERPL-MCNC: 285 UG/DL
UIBC SERPL-MCNC: 216 UG/DL
VIT B12 SERPL-MCNC: 808 PG/ML
WBC # FLD AUTO: 8.08 K/UL

## 2021-05-27 ENCOUNTER — OUTPATIENT (OUTPATIENT)
Dept: OUTPATIENT SERVICES | Facility: HOSPITAL | Age: 86
LOS: 1 days | Discharge: HOME | End: 2021-05-27

## 2021-05-27 DIAGNOSIS — D72.89 OTHER SPECIFIED DISORDERS OF WHITE BLOOD CELLS: ICD-10-CM

## 2021-06-01 ENCOUNTER — APPOINTMENT (OUTPATIENT)
Dept: HEMATOLOGY ONCOLOGY | Facility: CLINIC | Age: 86
End: 2021-06-01
Payer: MEDICARE

## 2021-06-01 PROCEDURE — 99213 OFFICE O/P EST LOW 20 MIN: CPT | Mod: 95

## 2021-06-01 RX ORDER — METOPROLOL SUCCINATE 25 MG/1
25 TABLET, EXTENDED RELEASE ORAL
Qty: 30 | Refills: 0 | Status: ACTIVE | COMMUNITY
Start: 2021-04-12

## 2021-06-01 RX ORDER — METOPROLOL SUCCINATE 50 MG/1
50 TABLET, EXTENDED RELEASE ORAL
Qty: 30 | Refills: 0 | Status: ACTIVE | COMMUNITY
Start: 2021-04-27

## 2021-06-01 NOTE — PHYSICAL EXAM
[Normal] : affect appropriate [de-identified] : non-toxic appearing [de-identified] : sclerae anicteric [de-identified] : breathing appeared unlabored [de-identified] : coloration appeared normal [de-identified] : alert and oriented  [de-identified] : pleasant

## 2021-06-01 NOTE — HISTORY OF PRESENT ILLNESS
[de-identified] : 7/2020-Patient presented at the request of her PCP for leukocytosis and anemia found on routine lab work. Intolerant to PO iron.\par \par \par \par  [de-identified] : Taking MVI with iron. Stopped taking iron supplement itself, due to diarrhea.\par No complaints of H/A, chest pain, shortness of breath at rest, palpitations or lightheadedness.  No nausea/vomiting/abdominal or pelvic pain.  No blood per rectum, melena, hematuria or vaginal bleeding.  No c/o dysuria or fevers. \par Daughter Elizabeth assisted with visit history.

## 2021-06-01 NOTE — ASSESSMENT
[FreeTextEntry1] : Lab work reviewed.\par #1)  Anemia–chronic.  History of decreased EGFR may contribute.  Most recently was exacerbated by hematoma/retroperitoneal bleed.  No overt bleeding reported currently. Patient without related symptoms at this time. Hemoglobin currently stable. \par Patient to continue p.o. vitamin B12 supplement for h/o a vitamin B12 level of less than 400.  Patient declines continuing p.o. iron due to GI intolerance, but will continue a multivitamin which contains iron p.o.  Interval follow-up lab work to be done.  Pending course, can consider trial of parenteral iron if needed and patient consents.\par She/daughter decline pursuing colonoscopy due to her advanced age.\par \par #2) H/O leukocytosis–suspect reactive in nature. Most recent WBC WNL. Follow clinically.\par \par Patient and her daughter were given the opportunity to ask questions.  Their questions have been answered to their apparent satisfaction.  Patient is agreeable to recommended follow-up.  Supportive hematologic care planned.

## 2021-06-01 NOTE — REASON FOR VISIT
[Home] : at home, [unfilled] , at the time of the visit. [Medical Office: ([facility])___] : at the medical office located in  [Family Member] : family member [Verbal consent obtained from patient] : the patient, [unfilled] [Follow-Up Visit] : a follow-up visit for [FreeTextEntry2] : anemia

## 2021-07-02 ENCOUNTER — NON-APPOINTMENT (OUTPATIENT)
Age: 86
End: 2021-07-02

## 2021-07-20 LAB
BASOPHILS # BLD AUTO: 0.04 K/UL
BASOPHILS NFR BLD AUTO: 0.4 %
CREAT SERPL-MCNC: 1.07 MG/DL
EOSINOPHIL # BLD AUTO: 0.07 K/UL
EOSINOPHIL NFR BLD AUTO: 0.7 %
FERRITIN SERPL-MCNC: 229 NG/ML
FOLATE SERPL-MCNC: >20 NG/ML
HAPTOGLOB SERPL-MCNC: 245 MG/DL
HCT VFR BLD CALC: 25.5 %
HGB BLD-MCNC: 8.1 G/DL
IMM GRANULOCYTES NFR BLD AUTO: 0.3 %
IRON SATN MFR SERPL: 17 %
IRON SERPL-MCNC: 42 UG/DL
LYMPHOCYTES # BLD AUTO: 1.52 K/UL
LYMPHOCYTES NFR BLD AUTO: 14.8 %
MAN DIFF?: NORMAL
MCHC RBC-ENTMCNC: 31.6 PG
MCHC RBC-ENTMCNC: 31.8 GM/DL
MCV RBC AUTO: 99.6 FL
MONOCYTES # BLD AUTO: 0.72 K/UL
MONOCYTES NFR BLD AUTO: 7 %
NEUTROPHILS # BLD AUTO: 7.86 K/UL
NEUTROPHILS NFR BLD AUTO: 76.8 %
PLATELET # BLD AUTO: 303 K/UL
RBC # BLD: 2.56 M/UL
RBC # BLD: 2.56 M/UL
RBC # FLD: 12.2 %
RETICS # AUTO: 1.3 %
RETICS AGGREG/RBC NFR: 32.3 K/UL
TIBC SERPL-MCNC: 252 UG/DL
UIBC SERPL-MCNC: 210 UG/DL
VIT B12 SERPL-MCNC: 1041 PG/ML
WBC # FLD AUTO: 10.24 K/UL

## 2021-07-24 ENCOUNTER — OUTPATIENT (OUTPATIENT)
Dept: OUTPATIENT SERVICES | Facility: HOSPITAL | Age: 86
LOS: 1 days | Discharge: HOME | End: 2021-07-24

## 2021-07-24 DIAGNOSIS — D72.89 OTHER SPECIFIED DISORDERS OF WHITE BLOOD CELLS: ICD-10-CM

## 2021-07-25 ENCOUNTER — NON-APPOINTMENT (OUTPATIENT)
Age: 86
End: 2021-07-25

## 2021-07-26 ENCOUNTER — APPOINTMENT (OUTPATIENT)
Dept: HEMATOLOGY ONCOLOGY | Facility: CLINIC | Age: 86
End: 2021-07-26

## 2021-07-27 ENCOUNTER — APPOINTMENT (OUTPATIENT)
Dept: HEMATOLOGY ONCOLOGY | Facility: CLINIC | Age: 86
End: 2021-07-27

## 2021-09-03 ENCOUNTER — NON-APPOINTMENT (OUTPATIENT)
Age: 86
End: 2021-09-03

## 2021-09-07 ENCOUNTER — NON-APPOINTMENT (OUTPATIENT)
Age: 86
End: 2021-09-07

## 2021-10-11 LAB
BASOPHILS # BLD AUTO: 0.02 K/UL
BASOPHILS NFR BLD AUTO: 0.3 %
EOSINOPHIL # BLD AUTO: 0.07 K/UL
EOSINOPHIL NFR BLD AUTO: 1 %
FERRITIN SERPL-MCNC: 305 NG/ML
FOLATE SERPL-MCNC: >20 NG/ML
HAPTOGLOB SERPL-MCNC: 234 MG/DL
HCT VFR BLD CALC: 24.2 %
HGB BLD-MCNC: 7.7 G/DL
IMM GRANULOCYTES NFR BLD AUTO: 0.1 %
IRON SATN MFR SERPL: 31 %
IRON SERPL-MCNC: 60 UG/DL
LYMPHOCYTES # BLD AUTO: 1.32 K/UL
LYMPHOCYTES NFR BLD AUTO: 18.3 %
MAN DIFF?: NORMAL
MCHC RBC-ENTMCNC: 30.3 PG
MCHC RBC-ENTMCNC: 31.8 GM/DL
MCV RBC AUTO: 95.3 FL
MONOCYTES # BLD AUTO: 0.61 K/UL
MONOCYTES NFR BLD AUTO: 8.4 %
NEUTROPHILS # BLD AUTO: 5.2 K/UL
NEUTROPHILS NFR BLD AUTO: 71.9 %
PLATELET # BLD AUTO: 234 K/UL
RBC # BLD: 2.54 M/UL
RBC # BLD: 2.54 M/UL
RBC # FLD: 14.5 %
RETICS # AUTO: 1.3 %
RETICS AGGREG/RBC NFR: 32 K/UL
TIBC SERPL-MCNC: 197 UG/DL
UIBC SERPL-MCNC: 137 UG/DL
VIT B12 SERPL-MCNC: 590 PG/ML
WBC # FLD AUTO: 7.23 K/UL

## 2021-10-12 ENCOUNTER — NON-APPOINTMENT (OUTPATIENT)
Age: 86
End: 2021-10-12

## 2021-10-19 LAB
BASOPHILS # BLD AUTO: 0.03 K/UL
BASOPHILS NFR BLD AUTO: 0.4 %
EOSINOPHIL # BLD AUTO: 0.05 K/UL
EOSINOPHIL NFR BLD AUTO: 0.6 %
HCT VFR BLD CALC: 24.6 %
HGB BLD-MCNC: 8.1 G/DL
IMM GRANULOCYTES NFR BLD AUTO: 0.2 %
LYMPHOCYTES # BLD AUTO: 1.1 K/UL
LYMPHOCYTES NFR BLD AUTO: 13.5 %
MAN DIFF?: NORMAL
MCHC RBC-ENTMCNC: 30.9 PG
MCHC RBC-ENTMCNC: 32.9 GM/DL
MCV RBC AUTO: 93.9 FL
MONOCYTES # BLD AUTO: 0.61 K/UL
MONOCYTES NFR BLD AUTO: 7.5 %
NEUTROPHILS # BLD AUTO: 6.34 K/UL
NEUTROPHILS NFR BLD AUTO: 77.8 %
PLATELET # BLD AUTO: 251 K/UL
RBC # BLD: 2.62 M/UL
RBC # FLD: 14.6 %
WBC # FLD AUTO: 8.15 K/UL

## 2021-10-20 ENCOUNTER — OUTPATIENT (OUTPATIENT)
Dept: OUTPATIENT SERVICES | Facility: HOSPITAL | Age: 86
LOS: 1 days | Discharge: HOME | End: 2021-10-20

## 2021-10-20 DIAGNOSIS — D72.89 OTHER SPECIFIED DISORDERS OF WHITE BLOOD CELLS: ICD-10-CM

## 2021-10-21 ENCOUNTER — APPOINTMENT (OUTPATIENT)
Dept: HEMATOLOGY ONCOLOGY | Facility: CLINIC | Age: 86
End: 2021-10-21
Payer: MEDICARE

## 2021-10-21 DIAGNOSIS — D72.829 ELEVATED WHITE BLOOD CELL COUNT, UNSPECIFIED: ICD-10-CM

## 2021-10-21 PROCEDURE — 99442: CPT | Mod: 95

## 2021-10-21 NOTE — ASSESSMENT
[FreeTextEntry1] : Lab work reviewed.\par #1)  Anemia–suspect chronic disease component.  History of decreased EGFR may contribute.  No overt bleeding reported currently. Patient without related symptoms at this time. Hemoglobin currently stable. \par Patient to take p.o. vitamin B12 supplement for h/o a vitamin B12 level of less than 400.  She will continue a multivitamin, as well as 1 iron tablet daily. Interval follow-up lab work to be done. \par PRN transfusional support-daughter only would want this done as outpatient.\par Patient/daughter have declined pursuing colonoscopy, or further evaluation such as BMB due to her advanced age. They are opting for supportive hematologic care, and patient comfort.\par \par #2) H/O leukocytosis–suspect reactive in nature. Most recent total WBC WNL. Follow clinically.\par \par Questions have been answered at this time.

## 2021-10-21 NOTE — REVIEW OF SYSTEMS
[Negative] : Allergic/Immunologic [Fever] : no fever [Eye Pain] : no eye pain [Chest Pain] : no chest pain [Dysuria] : no dysuria [Fainting] : no fainting

## 2021-10-21 NOTE — CONSULT LETTER
[Dear  ___] : Dear  [unfilled], [Courtesy Letter:] : I had the pleasure of seeing your patient, [unfilled], in my office today. [Please see my note below.] : Please see my note below. [Sincerely,] : Sincerely, [FreeTextEntry3] : Ana Currie MD

## 2021-10-21 NOTE — REASON FOR VISIT
[Follow-Up Visit] : a follow-up visit for [Home] : at home, [unfilled] , at the time of the visit. [Medical Office: ([facility])___] : at the medical office located in  [Family Member] : family member [FreeTextEntry3] : daughter [FreeTextEntry2] : anemia

## 2021-10-21 NOTE — HISTORY OF PRESENT ILLNESS
[de-identified] : 7/2020-Patient presented at the request of her PCP for leukocytosis and anemia found on routine lab work.\par \par \par \par  [de-identified] : S/P hospitalization at [facility] for AMS/"mental break." This was followed by stay in rehab facility. Course complicated by anemia requiring PRBC transfusions.\par AMS since rehab/sundowning, intermittently agitated at home.\par Has aide at home to assist in care.\par Taking MVI with iron. Taking iron supplement again once daily.\par No complaints of H/A, chest pain, shortness of breath at rest, palpitations or lightheadedness.  No nausea/vomiting/abdominal or pelvic pain.  No blood per rectum, melena, hematuria or vaginal bleeding reported.  No c/o dysuria or fevers. \par Daughter Elizabeth provided visit history.

## 2021-11-01 LAB
BASOPHILS # BLD AUTO: 0.02 K/UL
BASOPHILS NFR BLD AUTO: 0.4 %
EOSINOPHIL # BLD AUTO: 0.07 K/UL
EOSINOPHIL NFR BLD AUTO: 1.3 %
HCT VFR BLD CALC: 23.6 %
HGB BLD-MCNC: 7.6 G/DL
IMM GRANULOCYTES NFR BLD AUTO: 0.2 %
LYMPHOCYTES # BLD AUTO: 1.01 K/UL
LYMPHOCYTES NFR BLD AUTO: 18.5 %
MAN DIFF?: NORMAL
MCHC RBC-ENTMCNC: 30.9 PG
MCHC RBC-ENTMCNC: 32.2 GM/DL
MCV RBC AUTO: 95.9 FL
MONOCYTES # BLD AUTO: 0.5 K/UL
MONOCYTES NFR BLD AUTO: 9.1 %
NEUTROPHILS # BLD AUTO: 3.86 K/UL
NEUTROPHILS NFR BLD AUTO: 70.5 %
PLATELET # BLD AUTO: 228 K/UL
RBC # BLD: 2.46 M/UL
RBC # FLD: 14.5 %
WBC # FLD AUTO: 5.47 K/UL

## 2021-11-03 ENCOUNTER — OUTPATIENT (OUTPATIENT)
Dept: OUTPATIENT SERVICES | Facility: HOSPITAL | Age: 86
LOS: 1 days | End: 2021-11-03
Payer: MEDICARE

## 2021-11-03 DIAGNOSIS — D64.9 ANEMIA, UNSPECIFIED: ICD-10-CM

## 2021-11-04 ENCOUNTER — RESULT REVIEW (OUTPATIENT)
Age: 86
End: 2021-11-04

## 2021-11-04 ENCOUNTER — APPOINTMENT (OUTPATIENT)
Dept: HEMATOLOGY ONCOLOGY | Facility: CLINIC | Age: 86
End: 2021-11-04

## 2021-11-04 LAB
BASOPHILS # BLD AUTO: 0.03 K/UL — SIGNIFICANT CHANGE UP (ref 0–0.2)
BASOPHILS NFR BLD AUTO: 0.5 % — SIGNIFICANT CHANGE UP (ref 0–2)
EOSINOPHIL # BLD AUTO: 0.1 K/UL — SIGNIFICANT CHANGE UP (ref 0–0.5)
EOSINOPHIL NFR BLD AUTO: 1.5 % — SIGNIFICANT CHANGE UP (ref 0–6)
HCT VFR BLD CALC: 25.4 % — LOW (ref 34.5–45)
HGB BLD-MCNC: 8.3 G/DL — LOW (ref 11.5–15.5)
IMM GRANULOCYTES NFR BLD AUTO: 0.6 % — SIGNIFICANT CHANGE UP (ref 0–1.5)
LYMPHOCYTES # BLD AUTO: 1.64 K/UL — SIGNIFICANT CHANGE UP (ref 1–3.3)
LYMPHOCYTES # BLD AUTO: 24.7 % — SIGNIFICANT CHANGE UP (ref 13–44)
MCHC RBC-ENTMCNC: 31.2 PG — SIGNIFICANT CHANGE UP (ref 27–34)
MCHC RBC-ENTMCNC: 32.7 G/DL — SIGNIFICANT CHANGE UP (ref 32–36)
MCV RBC AUTO: 95.5 FL — SIGNIFICANT CHANGE UP (ref 80–100)
MONOCYTES # BLD AUTO: 0.49 K/UL — SIGNIFICANT CHANGE UP (ref 0–0.9)
MONOCYTES NFR BLD AUTO: 7.4 % — SIGNIFICANT CHANGE UP (ref 2–14)
NEUTROPHILS # BLD AUTO: 4.34 K/UL — SIGNIFICANT CHANGE UP (ref 1.8–7.4)
NEUTROPHILS NFR BLD AUTO: 65.3 % — SIGNIFICANT CHANGE UP (ref 43–77)
NRBC # BLD: 0 /100 WBCS — SIGNIFICANT CHANGE UP (ref 0–0)
PLATELET # BLD AUTO: 248 K/UL — SIGNIFICANT CHANGE UP (ref 150–400)
RBC # BLD: 2.66 M/UL — LOW (ref 3.8–5.2)
RBC # FLD: 13.7 % — SIGNIFICANT CHANGE UP (ref 10.3–14.5)
WBC # BLD: 6.64 K/UL — SIGNIFICANT CHANGE UP (ref 3.8–10.5)
WBC # FLD AUTO: 6.64 K/UL — SIGNIFICANT CHANGE UP (ref 3.8–10.5)

## 2021-11-04 PROCEDURE — 86901 BLOOD TYPING SEROLOGIC RH(D): CPT

## 2021-11-04 PROCEDURE — 86900 BLOOD TYPING SEROLOGIC ABO: CPT

## 2021-11-04 PROCEDURE — 86850 RBC ANTIBODY SCREEN: CPT

## 2021-11-04 PROCEDURE — 86923 COMPATIBILITY TEST ELECTRIC: CPT

## 2021-11-06 ENCOUNTER — APPOINTMENT (OUTPATIENT)
Dept: INFUSION THERAPY | Facility: HOSPITAL | Age: 86
End: 2021-11-06

## 2021-11-09 DIAGNOSIS — Z51.89 ENCOUNTER FOR OTHER SPECIFIED AFTERCARE: ICD-10-CM

## 2021-11-09 DIAGNOSIS — R11.2 NAUSEA WITH VOMITING, UNSPECIFIED: ICD-10-CM

## 2021-11-09 DIAGNOSIS — D64.9 ANEMIA, UNSPECIFIED: ICD-10-CM

## 2021-11-15 ENCOUNTER — LABORATORY RESULT (OUTPATIENT)
Age: 86
End: 2021-11-15

## 2021-11-26 ENCOUNTER — OUTPATIENT (OUTPATIENT)
Dept: OUTPATIENT SERVICES | Facility: HOSPITAL | Age: 86
LOS: 1 days | Discharge: HOME | End: 2021-11-26

## 2021-11-26 DIAGNOSIS — E53.8 DEFICIENCY OF OTHER SPECIFIED B GROUP VITAMINS: ICD-10-CM

## 2021-11-30 ENCOUNTER — APPOINTMENT (OUTPATIENT)
Dept: HEMATOLOGY ONCOLOGY | Facility: CLINIC | Age: 86
End: 2021-11-30

## 2021-12-02 ENCOUNTER — LABORATORY RESULT (OUTPATIENT)
Age: 86
End: 2021-12-02

## 2021-12-08 ENCOUNTER — APPOINTMENT (OUTPATIENT)
Dept: HEMATOLOGY ONCOLOGY | Facility: CLINIC | Age: 86
End: 2021-12-08
Payer: MEDICARE

## 2021-12-08 DIAGNOSIS — D64.9 ANEMIA, UNSPECIFIED: ICD-10-CM

## 2021-12-08 PROCEDURE — 99442: CPT | Mod: 95

## 2021-12-08 NOTE — HISTORY OF PRESENT ILLNESS
[de-identified] : 7/2020-Patient presented at the request of her PCP for leukocytosis and anemia found on routine lab work.\par \par \par \par  [de-identified] : Daughter Elizabeth provided history for visit.\par Declinig-bedbound and eating little now.\par Has 24 hour aides now..\par Taking iron supplement and vitamin B 12 inconsistently.\par No gross bleeding reported.

## 2021-12-08 NOTE — ASSESSMENT
[FreeTextEntry1] : Lab work reviewed.\par Anemia–suspect chronic disease component.  History of decreased EGFR may contribute.  No overt bleeding reported currently. Decrease in hemoglobin discussed.\par In light of patient's poor/declining PS, daughter does not wish for any more hematologic intervention/ transfusions at this point. She states patient is DNR.\par Offered availability of home Hospice care. Daughter stated she wished to confer with patient's PCP regarding this.\par Daughter's questions answered at this time. Patient will no longer actively f/u in this office. Daughter will call if any further questions for me. She is appreciative of care received.\par \par \par \par

## 2021-12-08 NOTE — REVIEW OF SYSTEMS
[Negative] : Allergic/Immunologic [Fever] : no fever [Eye Pain] : no eye pain [Nosebleeds] : no nosebleeds [Chest Pain] : no chest pain [Shortness Of Breath] : no shortness of breath [Vomiting] : no vomiting [Fainting] : no fainting

## 2021-12-08 NOTE — CONSULT LETTER
[Dear  ___] : Dear  [unfilled], [Courtesy Letter:] : I had the pleasure of seeing your patient, [unfilled], in my office today. [Please see my note below.] : Please see my note below. [Consult Closing:] : Thank you very much for allowing me to participate in the care of this patient.  If you have any questions, please do not hesitate to contact me. [Sincerely,] : Sincerely, [FreeTextEntry3] : Bimal Currie MD

## 2021-12-08 NOTE — REASON FOR VISIT
[Verbal consent obtained from patient] : the patient, [unfilled] [Follow-Up Visit] : a follow-up visit for [Home] : at home, [unfilled] , at the time of the visit. [Medical Office: ([facility])___] : at the medical office located in  [Family Member] : family member [FreeTextEntry3] : daughter [FreeTextEntry2] : anemia

## 2024-03-01 ENCOUNTER — NON-APPOINTMENT (OUTPATIENT)
Age: 89
End: 2024-03-01

## 2024-03-01 RX ORDER — METOPROLOL SUCCINATE 100 MG/1
100 TABLET, EXTENDED RELEASE ORAL DAILY
Refills: 0 | Status: ACTIVE | COMMUNITY